# Patient Record
Sex: MALE | Race: WHITE | NOT HISPANIC OR LATINO | Employment: FULL TIME | URBAN - METROPOLITAN AREA
[De-identification: names, ages, dates, MRNs, and addresses within clinical notes are randomized per-mention and may not be internally consistent; named-entity substitution may affect disease eponyms.]

---

## 2023-03-01 ENCOUNTER — OFFICE VISIT (OUTPATIENT)
Dept: GASTROENTEROLOGY | Facility: CLINIC | Age: 38
End: 2023-03-01
Payer: COMMERCIAL

## 2023-03-01 VITALS
DIASTOLIC BLOOD PRESSURE: 71 MMHG | TEMPERATURE: 97.8 F | HEIGHT: 71 IN | SYSTOLIC BLOOD PRESSURE: 140 MMHG | BODY MASS INDEX: 25.81 KG/M2 | WEIGHT: 184.4 LBS | HEART RATE: 69 BPM

## 2023-03-01 DIAGNOSIS — R74.8 ELEVATED LIVER ENZYMES: Primary | ICD-10-CM

## 2023-03-01 PROCEDURE — 99204 OFFICE O/P NEW MOD 45 MIN: CPT | Performed by: NURSE PRACTITIONER

## 2023-03-01 RX ORDER — VITAMIN E 268 MG
CAPSULE ORAL
COMMUNITY
Start: 2023-01-19

## 2023-03-01 RX ORDER — FLUOXETINE HYDROCHLORIDE 20 MG/1
1 CAPSULE ORAL DAILY
COMMUNITY
Start: 2023-01-19

## 2023-03-01 RX ORDER — PRAZOSIN HYDROCHLORIDE 1 MG/1
CAPSULE ORAL
COMMUNITY
Start: 2023-01-19

## 2023-03-01 RX ORDER — PROPRANOLOL HYDROCHLORIDE 80 MG/1
CAPSULE, EXTENDED RELEASE ORAL
COMMUNITY
Start: 2023-01-19

## 2023-03-01 RX ORDER — AMLODIPINE BESYLATE 5 MG/1
1 TABLET ORAL DAILY
COMMUNITY
Start: 2023-01-14

## 2023-03-01 RX ORDER — LANOLIN ALCOHOL/MO/W.PET/CERES
1 CREAM (GRAM) TOPICAL DAILY
COMMUNITY
Start: 2023-02-10

## 2023-03-01 RX ORDER — FOLIC ACID 1 MG/1
1 TABLET ORAL DAILY
COMMUNITY
Start: 2023-02-10

## 2023-03-01 NOTE — PROGRESS NOTES
"GASTROENTEROLOGY OFFICE NOTE  Swapnil Herrera  [id number]  1985    CARE TEAM  Patient Care Team:  Fouzia Heard APRN as PCP - General (Family Medicine)    Referring Provider: Fouzia Heard APRN    Chief Complaint   Patient presents with   • Elevated Hepatic Enzymes        HISTORY OF PRESENT ILLNESS:  Mr. Herrera is a very pleasant 37-year-old male with a history of EtOH abuse  recently hospitalized at [facility] for alcohol induced pancreatitis (January 2023) referred to GI/hepatology for elevated liver enzymes and to assess for liver fibrosis.    Clinically, he has recovered fully from pancreatitis.  He denies any abdominal pain.  He is eating well without postprandial abdominal pain.    The patient reports a history of drinking \"a lot\" of alcohol nightly for the past 6 to 7 years.  He has had complete abstinence from alcohol since January.    PAST MEDICAL HISTORY  Past Medical History:   Diagnosis Date   • Pancreatitis         PAST SURGICAL HISTORY  Past Surgical History:   Procedure Laterality Date   • COLONOSCOPY     • SHOULDER SURGERY Right 2011        MEDICATIONS:    Current Outpatient Medications:   •  amLODIPine (NORVASC) 5 MG tablet, Take 1 tablet by mouth Daily., Disp: , Rfl:   •  FLUoxetine (PROzac) 20 MG capsule, Take 1 capsule by mouth Daily., Disp: , Rfl:   •  folic acid (FOLVITE) 1 MG tablet, Take 1 tablet by mouth Daily., Disp: , Rfl:   •  prazosin (MINIPRESS) 1 MG capsule, TAKE 1 CAPSULE BY MOUTH EVERY EVENING FOR NIGHTMARES, Disp: , Rfl:   •  propranolol LA (INDERAL LA) 80 MG 24 hr capsule, TAKE ONE CAPSULE BY MOUTH EVERY DAY FOR HYPERTENSION/ANXIETY, Disp: , Rfl:   •  thiamine (VITAMIN B1) 100 MG tablet, Take 1 tablet by mouth Daily., Disp: , Rfl:   •  vitamin D3 125 MCG (5000 UT) capsule capsule, Take 1 capsule by mouth Daily., Disp: , Rfl:   •  Vitamin E 180 MG (400 UNIT) capsule capsule, TAKE 1 EVERY DAY, Disp: , Rfl:     ALLERGIES  No Known " "Allergies    FAMILY HISTORY:  Family History   Problem Relation Age of Onset   • Colon cancer Neg Hx        SOCIAL HISTORY  Social History     Socioeconomic History   • Marital status:    Tobacco Use   • Smoking status: Former     Types: Cigarettes   • Smokeless tobacco: Current     Types: Chew   Vaping Use   • Vaping Use: Every day   • Substances: Nicotine   • Devices: Disposable   Substance and Sexual Activity   • Alcohol use: Not Currently   • Drug use: Not Currently   • Sexual activity: Defer         PHYSICAL EXAM   /71 (BP Location: Right arm, Patient Position: Sitting, Cuff Size: Adult)   Pulse 69   Temp 97.8 °F (36.6 °C) (Temporal)   Ht 180.3 cm (71\")   Wt 83.6 kg (184 lb 6.4 oz)   BMI 25.72 kg/m²   Physical Exam  Constitutional:       Appearance: Normal appearance.   HENT:      Head: Normocephalic and atraumatic.   Pulmonary:      Effort: Pulmonary effort is normal.   Abdominal:      General: There is no distension.      Palpations: Abdomen is soft.      Tenderness: There is no abdominal tenderness.   Neurological:      Mental Status: He is alert and oriented to person, place, and time.   Psychiatric:         Mood and Affect: Mood normal.         Thought Content: Thought content normal.       ASSESSMENT / PLAN  Diagnoses and all orders for this visit:    1. Elevated liver enzymes (Primary)  -     Alpha - 1 - Antitrypsin; Future  -     CHUCK; Future  -     Anti-microsomal Antibody; Future  -     Anti-Smooth Muscle Antibody Titer; Future  -     CBC & Differential; Future  -     Celiac Comprehensive Panel; Future  -     Ceruloplasmin; Future  -     Comprehensive Metabolic Panel; Future  -     Ferritin; Future  -     Hepatitis A Antibody, Total; Future  -     Hepatitis B Core Antibody, IgM; Future  -     Hepatitis B Core Antibody, Total; Future  -     Hepatitis B Surf Antibody Quant; Future  -     Hepatitis B Surface Antibody; Future  -     Hepatitis B Surface Antigen; Future  -     Hepatitis C " Antibody; Future  -     IgG, IgA, IgM; Future  -     Iron; Future  -     Mitochondrial Antibodies, M2; Future  -     Protime-INR; Future  -     US Liver; Future    -Ultrasound liver with elastography    I suspect his elevation of liver enzymes is related to his alcohol use however, will complete full serology testing to rule out other etiologies as well.    Return for Follow up after procedures.    I discussed the patients findings and my recommendations with patient    MAYUR Shah

## 2023-03-06 ENCOUNTER — DOCUMENTATION (OUTPATIENT)
Dept: GASTROENTEROLOGY | Facility: CLINIC | Age: 38
End: 2023-03-06
Payer: COMMERCIAL

## 2023-03-06 NOTE — PROGRESS NOTES
GASTROENTEROLOGY OFFICE NOTE  Swapnil Herrera  [id number]  1985    CARE TEAM  Patient Care Team:  Fouzia Heard APRN as PCP - General (Family Medicine)    Reviewed blood work results ordered on 3/1/2023 that patient had drawn at his primary care office.  Liver serologies are negative for autoimmune or genetic diseases.  His liver enzymes have normalized since discontinuing alcohol consumption.  LAINEZ FibroSure predictive of F0 fibrosis.    MAYUR Shah

## 2023-11-13 ENCOUNTER — TRANSCRIBE ORDERS (OUTPATIENT)
Dept: ADMINISTRATIVE | Facility: HOSPITAL | Age: 38
End: 2023-11-13
Payer: COMMERCIAL

## 2023-11-13 DIAGNOSIS — E88.09 OTHER DISORDERS OF PLASMA-PROTEIN METABOLISM, NOT ELSEWHERE CLASSIFIED: Primary | ICD-10-CM

## 2023-11-13 DIAGNOSIS — Z79.899 LONG TERM USE OF DRUG: ICD-10-CM

## 2023-12-03 ENCOUNTER — HOSPITAL ENCOUNTER (OUTPATIENT)
Dept: ULTRASOUND IMAGING | Facility: HOSPITAL | Age: 38
Discharge: HOME | End: 2023-12-03
Admitting: NURSE PRACTITIONER

## 2023-12-03 DIAGNOSIS — Z79.899 LONG TERM USE OF DRUG: ICD-10-CM

## 2023-12-03 DIAGNOSIS — E88.09 OTHER DISORDERS OF PLASMA-PROTEIN METABOLISM, NOT ELSEWHERE CLASSIFIED: ICD-10-CM

## 2023-12-03 PROCEDURE — 76705 ECHO EXAM OF ABDOMEN: CPT

## 2023-12-03 PROCEDURE — 76981 USE PARENCHYMA: CPT
